# Patient Record
Sex: MALE | Race: OTHER | HISPANIC OR LATINO | URBAN - METROPOLITAN AREA
[De-identification: names, ages, dates, MRNs, and addresses within clinical notes are randomized per-mention and may not be internally consistent; named-entity substitution may affect disease eponyms.]

---

## 2018-07-16 ENCOUNTER — EMERGENCY (EMERGENCY)
Facility: HOSPITAL | Age: 63
LOS: 1 days | Discharge: HOME | End: 2018-07-16
Attending: EMERGENCY MEDICINE
Payer: SELF-PAY

## 2018-07-16 VITALS — WEIGHT: 186.95 LBS

## 2018-07-16 LAB
ALBUMIN SERPL ELPH-MCNC: 4.4 G/DL — SIGNIFICANT CHANGE UP (ref 3.3–5.2)
ALP SERPL-CCNC: 85 U/L — SIGNIFICANT CHANGE UP (ref 40–120)
ALT FLD-CCNC: 36 U/L — SIGNIFICANT CHANGE UP
ANION GAP SERPL CALC-SCNC: 14 MMOL/L — SIGNIFICANT CHANGE UP (ref 5–17)
APPEARANCE UR: ABNORMAL
APTT BLD: 25.7 SEC — LOW (ref 27.5–37.4)
AST SERPL-CCNC: 29 U/L — SIGNIFICANT CHANGE UP
BACTERIA # UR AUTO: ABNORMAL
BASOPHILS # BLD AUTO: 0 K/UL — SIGNIFICANT CHANGE UP (ref 0–0.2)
BASOPHILS NFR BLD AUTO: 0.1 % — SIGNIFICANT CHANGE UP (ref 0–2)
BILIRUB SERPL-MCNC: 0.3 MG/DL — LOW (ref 0.4–2)
BILIRUB UR-MCNC: NEGATIVE — SIGNIFICANT CHANGE UP
BLD GP AB SCN SERPL QL: SIGNIFICANT CHANGE UP
BUN SERPL-MCNC: 14 MG/DL — SIGNIFICANT CHANGE UP (ref 8–20)
CALCIUM SERPL-MCNC: 9.3 MG/DL — SIGNIFICANT CHANGE UP (ref 8.6–10.2)
CHLORIDE SERPL-SCNC: 105 MMOL/L — SIGNIFICANT CHANGE UP (ref 98–107)
CO2 SERPL-SCNC: 24 MMOL/L — SIGNIFICANT CHANGE UP (ref 22–29)
COLOR SPEC: YELLOW — SIGNIFICANT CHANGE UP
CREAT SERPL-MCNC: 0.85 MG/DL — SIGNIFICANT CHANGE UP (ref 0.5–1.3)
DIFF PNL FLD: ABNORMAL
EOSINOPHIL # BLD AUTO: 0 K/UL — SIGNIFICANT CHANGE UP (ref 0–0.5)
EOSINOPHIL NFR BLD AUTO: 0.2 % — SIGNIFICANT CHANGE UP (ref 0–5)
EPI CELLS # UR: SIGNIFICANT CHANGE UP
GLUCOSE SERPL-MCNC: 129 MG/DL — HIGH (ref 70–115)
GLUCOSE UR QL: NEGATIVE MG/DL — SIGNIFICANT CHANGE UP
HCT VFR BLD CALC: 45.2 % — SIGNIFICANT CHANGE UP (ref 42–52)
HGB BLD-MCNC: 15.3 G/DL — SIGNIFICANT CHANGE UP (ref 14–18)
INR BLD: 1.04 RATIO — SIGNIFICANT CHANGE UP (ref 0.88–1.16)
KETONES UR-MCNC: ABNORMAL
LEUKOCYTE ESTERASE UR-ACNC: ABNORMAL
LYMPHOCYTES # BLD AUTO: 1.2 K/UL — SIGNIFICANT CHANGE UP (ref 1–4.8)
LYMPHOCYTES # BLD AUTO: 8.6 % — LOW (ref 20–55)
MCHC RBC-ENTMCNC: 29.2 PG — SIGNIFICANT CHANGE UP (ref 27–31)
MCHC RBC-ENTMCNC: 33.8 G/DL — SIGNIFICANT CHANGE UP (ref 32–36)
MCV RBC AUTO: 86.3 FL — SIGNIFICANT CHANGE UP (ref 80–94)
MONOCYTES # BLD AUTO: 0.4 K/UL — SIGNIFICANT CHANGE UP (ref 0–0.8)
MONOCYTES NFR BLD AUTO: 2.5 % — LOW (ref 3–10)
NEUTROPHILS # BLD AUTO: 12.5 K/UL — HIGH (ref 1.8–8)
NEUTROPHILS NFR BLD AUTO: 88.3 % — HIGH (ref 37–73)
NITRITE UR-MCNC: NEGATIVE — SIGNIFICANT CHANGE UP
PH UR: 5 — SIGNIFICANT CHANGE UP (ref 5–8)
PLATELET # BLD AUTO: 176 K/UL — SIGNIFICANT CHANGE UP (ref 150–400)
POTASSIUM SERPL-MCNC: 4 MMOL/L — SIGNIFICANT CHANGE UP (ref 3.5–5.3)
POTASSIUM SERPL-SCNC: 4 MMOL/L — SIGNIFICANT CHANGE UP (ref 3.5–5.3)
PROT SERPL-MCNC: 7.6 G/DL — SIGNIFICANT CHANGE UP (ref 6.6–8.7)
PROT UR-MCNC: 30 MG/DL
PROTHROM AB SERPL-ACNC: 11.5 SEC — SIGNIFICANT CHANGE UP (ref 9.8–12.7)
RBC # BLD: 5.24 M/UL — SIGNIFICANT CHANGE UP (ref 4.6–6.2)
RBC # FLD: 12.6 % — SIGNIFICANT CHANGE UP (ref 11–15.6)
RBC CASTS # UR COMP ASSIST: >50 /HPF (ref 0–4)
SODIUM SERPL-SCNC: 143 MMOL/L — SIGNIFICANT CHANGE UP (ref 135–145)
SP GR SPEC: 1.01 — SIGNIFICANT CHANGE UP (ref 1.01–1.02)
TROPONIN T SERPL-MCNC: <0.01 NG/ML — SIGNIFICANT CHANGE UP (ref 0–0.06)
TYPE + AB SCN PNL BLD: SIGNIFICANT CHANGE UP
UROBILINOGEN FLD QL: NEGATIVE MG/DL — SIGNIFICANT CHANGE UP
WBC # BLD: 14.2 K/UL — HIGH (ref 4.8–10.8)
WBC # FLD AUTO: 14.2 K/UL — HIGH (ref 4.8–10.8)
WBC UR QL: SIGNIFICANT CHANGE UP

## 2018-07-16 PROCEDURE — 71045 X-RAY EXAM CHEST 1 VIEW: CPT | Mod: 26

## 2018-07-16 PROCEDURE — 85027 COMPLETE CBC AUTOMATED: CPT

## 2018-07-16 PROCEDURE — 99285 EMERGENCY DEPT VISIT HI MDM: CPT

## 2018-07-16 PROCEDURE — 83690 ASSAY OF LIPASE: CPT

## 2018-07-16 PROCEDURE — 84484 ASSAY OF TROPONIN QUANT: CPT

## 2018-07-16 PROCEDURE — 74176 CT ABD & PELVIS W/O CONTRAST: CPT

## 2018-07-16 PROCEDURE — 71045 X-RAY EXAM CHEST 1 VIEW: CPT

## 2018-07-16 PROCEDURE — 86850 RBC ANTIBODY SCREEN: CPT

## 2018-07-16 PROCEDURE — 96375 TX/PRO/DX INJ NEW DRUG ADDON: CPT

## 2018-07-16 PROCEDURE — 81001 URINALYSIS AUTO W/SCOPE: CPT

## 2018-07-16 PROCEDURE — 80053 COMPREHEN METABOLIC PANEL: CPT

## 2018-07-16 PROCEDURE — 93010 ELECTROCARDIOGRAM REPORT: CPT

## 2018-07-16 PROCEDURE — 86900 BLOOD TYPING SEROLOGIC ABO: CPT

## 2018-07-16 PROCEDURE — 93005 ELECTROCARDIOGRAM TRACING: CPT

## 2018-07-16 PROCEDURE — 85610 PROTHROMBIN TIME: CPT

## 2018-07-16 PROCEDURE — 36415 COLL VENOUS BLD VENIPUNCTURE: CPT

## 2018-07-16 PROCEDURE — 99284 EMERGENCY DEPT VISIT MOD MDM: CPT | Mod: 25

## 2018-07-16 PROCEDURE — 86901 BLOOD TYPING SEROLOGIC RH(D): CPT

## 2018-07-16 PROCEDURE — T1013: CPT

## 2018-07-16 PROCEDURE — 96374 THER/PROPH/DIAG INJ IV PUSH: CPT

## 2018-07-16 PROCEDURE — 85730 THROMBOPLASTIN TIME PARTIAL: CPT

## 2018-07-16 PROCEDURE — 74176 CT ABD & PELVIS W/O CONTRAST: CPT | Mod: 26

## 2018-07-16 RX ORDER — SODIUM CHLORIDE 9 MG/ML
3 INJECTION INTRAMUSCULAR; INTRAVENOUS; SUBCUTANEOUS ONCE
Qty: 0 | Refills: 0 | Status: COMPLETED | OUTPATIENT
Start: 2018-07-16 | End: 2018-07-16

## 2018-07-16 RX ORDER — AMLODIPINE BESYLATE 2.5 MG/1
5 TABLET ORAL ONCE
Qty: 0 | Refills: 0 | Status: COMPLETED | OUTPATIENT
Start: 2018-07-16 | End: 2018-07-16

## 2018-07-16 RX ORDER — ONDANSETRON 8 MG/1
4 TABLET, FILM COATED ORAL ONCE
Qty: 0 | Refills: 0 | Status: COMPLETED | OUTPATIENT
Start: 2018-07-16 | End: 2018-07-16

## 2018-07-16 RX ORDER — TAMSULOSIN HYDROCHLORIDE 0.4 MG/1
1 CAPSULE ORAL
Qty: 5 | Refills: 0 | OUTPATIENT
Start: 2018-07-16 | End: 2018-07-20

## 2018-07-16 RX ORDER — LABETALOL HCL 100 MG
10 TABLET ORAL ONCE
Qty: 0 | Refills: 0 | Status: COMPLETED | OUTPATIENT
Start: 2018-07-16 | End: 2018-07-16

## 2018-07-16 RX ORDER — SODIUM CHLORIDE 9 MG/ML
1000 INJECTION INTRAMUSCULAR; INTRAVENOUS; SUBCUTANEOUS ONCE
Qty: 0 | Refills: 0 | Status: COMPLETED | OUTPATIENT
Start: 2018-07-16 | End: 2018-07-16

## 2018-07-16 RX ORDER — IBUPROFEN 200 MG
1 TABLET ORAL
Qty: 15 | Refills: 0 | OUTPATIENT
Start: 2018-07-16 | End: 2018-07-20

## 2018-07-16 RX ORDER — MORPHINE SULFATE 50 MG/1
4 CAPSULE, EXTENDED RELEASE ORAL ONCE
Qty: 0 | Refills: 0 | Status: DISCONTINUED | OUTPATIENT
Start: 2018-07-16 | End: 2018-07-16

## 2018-07-16 RX ORDER — CEFTRIAXONE 500 MG/1
1 INJECTION, POWDER, FOR SOLUTION INTRAMUSCULAR; INTRAVENOUS ONCE
Qty: 0 | Refills: 0 | Status: COMPLETED | OUTPATIENT
Start: 2018-07-16 | End: 2018-07-16

## 2018-07-16 RX ORDER — CEPHALEXIN 500 MG
1 CAPSULE ORAL
Qty: 15 | Refills: 0 | OUTPATIENT
Start: 2018-07-16 | End: 2018-07-20

## 2018-07-16 RX ADMIN — MORPHINE SULFATE 4 MILLIGRAM(S): 50 CAPSULE, EXTENDED RELEASE ORAL at 22:21

## 2018-07-16 RX ADMIN — Medication 10 MILLIGRAM(S): at 23:22

## 2018-07-16 RX ADMIN — ONDANSETRON 4 MILLIGRAM(S): 8 TABLET, FILM COATED ORAL at 22:21

## 2018-07-16 RX ADMIN — CEFTRIAXONE 100 GRAM(S): 500 INJECTION, POWDER, FOR SOLUTION INTRAMUSCULAR; INTRAVENOUS at 23:01

## 2018-07-16 RX ADMIN — AMLODIPINE BESYLATE 5 MILLIGRAM(S): 2.5 TABLET ORAL at 23:22

## 2018-07-16 RX ADMIN — Medication 0.1 MILLIGRAM(S): at 22:29

## 2018-07-16 RX ADMIN — SODIUM CHLORIDE 3 MILLILITER(S): 9 INJECTION INTRAMUSCULAR; INTRAVENOUS; SUBCUTANEOUS at 22:05

## 2018-07-16 NOTE — ED PROVIDER NOTE - CARE PLAN
Principal Discharge DX:	Renal colic on right side  Secondary Diagnosis:	Elevated blood pressure reading

## 2018-07-16 NOTE — ED PROVIDER NOTE - PROGRESS NOTE DETAILS
The patient appears well and currently has no pain.  No HA, No dizziness and No CP, No SOB.  The patient has elevated BP, will DC home and will have close follow up in outpatient clinic Case s/o to Dr Asif to re-check the BP

## 2018-07-16 NOTE — ED PROVIDER NOTE - MUSCULOSKELETAL, MLM
Spine appears normal, range of motion is not limited, no muscle or joint tenderness. No flank tenderness.

## 2018-07-16 NOTE — ED ADULT NURSE NOTE - DISCHARGE TEACHING
meds at pharmacy for stone and BP. VERY IMPORTANT must f//u with [facility] for BP.  used to educate patient and pt verbalized understanding.

## 2018-07-16 NOTE — ED PROVIDER NOTE - MEDICAL DECISION MAKING DETAILS
Patient presents with symptoms consistent with renal stone. Will check labs, non-contrast CT stone hunt, medicate for high blood pressure and re-evaluate.

## 2018-07-16 NOTE — ED PROVIDER NOTE - OBJECTIVE STATEMENT
63 y/o M presents to ED c/o sudden onset of constant sharp lower abdominal pain onset 1700 today. made worse with urination. Patient states the pain does not radiate, and he has associated nausea, dysuria/ burning sensation with urination and hematuria. As per girlfriend at bedside, the patient has not been to a doctor in a very long time. Upon finding out his BP was high in the ED, he states "maybe that is why I have been very dizzy". Admits to recent sexual activity. Denies fevers, chills, chest pain, back pain, vomiting, diarrhea, penile discharge or headaches. No further acute complaints at this time.     PMD: Does not currently have one

## 2018-07-16 NOTE — ED ADULT NURSE NOTE - OBJECTIVE STATEMENT
Pt playful upon assessment states he had sudden onset of RLQ pain with 1 episode of vomiting which has now resolved. Pt non-tender to palpation to RLQ. Pt also states he has slight hematuria but no pain. Pt without CVA tenderness. Pt denies fever/chills.

## 2018-07-17 VITALS — SYSTOLIC BLOOD PRESSURE: 162 MMHG | DIASTOLIC BLOOD PRESSURE: 103 MMHG

## 2018-07-18 ENCOUNTER — EMERGENCY (EMERGENCY)
Facility: HOSPITAL | Age: 63
LOS: 1 days | Discharge: LEFT BEFORE DISCHARGE | End: 2018-07-18
Payer: SELF-PAY

## 2018-07-18 VITALS — HEIGHT: 63 IN | WEIGHT: 189.6 LBS

## 2018-07-18 PROCEDURE — T1013: CPT

## 2018-07-19 VITALS
RESPIRATION RATE: 18 BRPM | HEART RATE: 80 BPM | OXYGEN SATURATION: 95 % | SYSTOLIC BLOOD PRESSURE: 183 MMHG | DIASTOLIC BLOOD PRESSURE: 103 MMHG | TEMPERATURE: 98 F

## 2018-12-18 ENCOUNTER — INPATIENT (INPATIENT)
Facility: HOSPITAL | Age: 63
LOS: 1 days | Discharge: HOME | DRG: 42 | End: 2018-12-20
Attending: HOSPITALIST | Admitting: HOSPITALIST
Payer: COMMERCIAL

## 2018-12-18 VITALS
OXYGEN SATURATION: 99 % | RESPIRATION RATE: 18 BRPM | SYSTOLIC BLOOD PRESSURE: 197 MMHG | TEMPERATURE: 98 F | HEIGHT: 63 IN | HEART RATE: 70 BPM | WEIGHT: 179.9 LBS | DIASTOLIC BLOOD PRESSURE: 114 MMHG

## 2018-12-18 DIAGNOSIS — I16.0 HYPERTENSIVE URGENCY: ICD-10-CM

## 2018-12-18 DIAGNOSIS — I63.321 CEREBRAL INFARCTION DUE TO THROMBOSIS OF RIGHT ANTERIOR CEREBRAL ARTERY: ICD-10-CM

## 2018-12-18 DIAGNOSIS — Z72.0 TOBACCO USE: ICD-10-CM

## 2018-12-18 DIAGNOSIS — E03.9 HYPOTHYROIDISM, UNSPECIFIED: ICD-10-CM

## 2018-12-18 DIAGNOSIS — Z79.82 LONG TERM (CURRENT) USE OF ASPIRIN: ICD-10-CM

## 2018-12-18 DIAGNOSIS — Z88.1 ALLERGY STATUS TO OTHER ANTIBIOTIC AGENTS STATUS: ICD-10-CM

## 2018-12-18 DIAGNOSIS — R29.898 OTHER SYMPTOMS AND SIGNS INVOLVING THE MUSCULOSKELETAL SYSTEM: ICD-10-CM

## 2018-12-18 LAB
ALBUMIN SERPL ELPH-MCNC: 3.9 G/DL — SIGNIFICANT CHANGE UP (ref 3.3–5.2)
ALP SERPL-CCNC: 85 U/L — SIGNIFICANT CHANGE UP (ref 40–120)
ALT FLD-CCNC: 19 U/L — SIGNIFICANT CHANGE UP
ANION GAP SERPL CALC-SCNC: 10 MMOL/L — SIGNIFICANT CHANGE UP (ref 5–17)
APTT BLD: 35 SEC — SIGNIFICANT CHANGE UP (ref 27.5–36.3)
AST SERPL-CCNC: 20 U/L — SIGNIFICANT CHANGE UP
BASOPHILS # BLD AUTO: 0 K/UL — SIGNIFICANT CHANGE UP (ref 0–0.2)
BASOPHILS NFR BLD AUTO: 0.3 % — SIGNIFICANT CHANGE UP (ref 0–2)
BILIRUB SERPL-MCNC: 0.3 MG/DL — LOW (ref 0.4–2)
BLD GP AB SCN SERPL QL: SIGNIFICANT CHANGE UP
BUN SERPL-MCNC: 12 MG/DL — SIGNIFICANT CHANGE UP (ref 8–20)
CALCIUM SERPL-MCNC: 9.2 MG/DL — SIGNIFICANT CHANGE UP (ref 8.6–10.2)
CHLORIDE SERPL-SCNC: 104 MMOL/L — SIGNIFICANT CHANGE UP (ref 98–107)
CK SERPL-CCNC: 115 U/L — SIGNIFICANT CHANGE UP (ref 30–200)
CO2 SERPL-SCNC: 24 MMOL/L — SIGNIFICANT CHANGE UP (ref 22–29)
CREAT SERPL-MCNC: 0.57 MG/DL — SIGNIFICANT CHANGE UP (ref 0.5–1.3)
EOSINOPHIL # BLD AUTO: 0.2 K/UL — SIGNIFICANT CHANGE UP (ref 0–0.5)
EOSINOPHIL NFR BLD AUTO: 3.4 % — SIGNIFICANT CHANGE UP (ref 0–5)
GLUCOSE SERPL-MCNC: 97 MG/DL — SIGNIFICANT CHANGE UP (ref 70–115)
HCT VFR BLD CALC: 45.4 % — SIGNIFICANT CHANGE UP (ref 42–52)
HGB BLD-MCNC: 15.2 G/DL — SIGNIFICANT CHANGE UP (ref 14–18)
INR BLD: 1.08 RATIO — SIGNIFICANT CHANGE UP (ref 0.88–1.16)
LYMPHOCYTES # BLD AUTO: 1 K/UL — SIGNIFICANT CHANGE UP (ref 1–4.8)
LYMPHOCYTES # BLD AUTO: 16.8 % — LOW (ref 20–55)
MCHC RBC-ENTMCNC: 29 PG — SIGNIFICANT CHANGE UP (ref 27–31)
MCHC RBC-ENTMCNC: 33.5 G/DL — SIGNIFICANT CHANGE UP (ref 32–36)
MCV RBC AUTO: 86.5 FL — SIGNIFICANT CHANGE UP (ref 80–94)
MONOCYTES # BLD AUTO: 0.6 K/UL — SIGNIFICANT CHANGE UP (ref 0–0.8)
MONOCYTES NFR BLD AUTO: 10.5 % — HIGH (ref 3–10)
NEUTROPHILS # BLD AUTO: 4 K/UL — SIGNIFICANT CHANGE UP (ref 1.8–8)
NEUTROPHILS NFR BLD AUTO: 68.8 % — SIGNIFICANT CHANGE UP (ref 37–73)
PLATELET # BLD AUTO: 179 K/UL — SIGNIFICANT CHANGE UP (ref 150–400)
POTASSIUM SERPL-MCNC: 4.4 MMOL/L — SIGNIFICANT CHANGE UP (ref 3.5–5.3)
POTASSIUM SERPL-SCNC: 4.4 MMOL/L — SIGNIFICANT CHANGE UP (ref 3.5–5.3)
PROT SERPL-MCNC: 7 G/DL — SIGNIFICANT CHANGE UP (ref 6.6–8.7)
PROTHROM AB SERPL-ACNC: 12.5 SEC — SIGNIFICANT CHANGE UP (ref 10–12.9)
RBC # BLD: 5.25 M/UL — SIGNIFICANT CHANGE UP (ref 4.6–6.2)
RBC # FLD: 12.5 % — SIGNIFICANT CHANGE UP (ref 11–15.6)
SODIUM SERPL-SCNC: 138 MMOL/L — SIGNIFICANT CHANGE UP (ref 135–145)
TROPONIN T SERPL-MCNC: <0.01 NG/ML — SIGNIFICANT CHANGE UP (ref 0–0.06)
TYPE + AB SCN PNL BLD: SIGNIFICANT CHANGE UP
WBC # BLD: 5.8 K/UL — SIGNIFICANT CHANGE UP (ref 4.8–10.8)
WBC # FLD AUTO: 5.8 K/UL — SIGNIFICANT CHANGE UP (ref 4.8–10.8)

## 2018-12-18 PROCEDURE — 93306 TTE W/DOPPLER COMPLETE: CPT | Mod: 26

## 2018-12-18 PROCEDURE — 71045 X-RAY EXAM CHEST 1 VIEW: CPT | Mod: 26

## 2018-12-18 PROCEDURE — 70450 CT HEAD/BRAIN W/O DYE: CPT | Mod: 26

## 2018-12-18 PROCEDURE — 99223 1ST HOSP IP/OBS HIGH 75: CPT

## 2018-12-18 PROCEDURE — 99291 CRITICAL CARE FIRST HOUR: CPT

## 2018-12-18 PROCEDURE — 70551 MRI BRAIN STEM W/O DYE: CPT | Mod: 26

## 2018-12-18 PROCEDURE — 93010 ELECTROCARDIOGRAM REPORT: CPT

## 2018-12-18 PROCEDURE — 70547 MR ANGIOGRAPHY NECK W/O DYE: CPT | Mod: 26

## 2018-12-18 PROCEDURE — 70544 MR ANGIOGRAPHY HEAD W/O DYE: CPT | Mod: 26,59

## 2018-12-18 RX ORDER — ATORVASTATIN CALCIUM 80 MG/1
80 TABLET, FILM COATED ORAL AT BEDTIME
Qty: 0 | Refills: 0 | Status: DISCONTINUED | OUTPATIENT
Start: 2018-12-18 | End: 2018-12-20

## 2018-12-18 RX ORDER — ONDANSETRON 8 MG/1
4 TABLET, FILM COATED ORAL EVERY 6 HOURS
Qty: 0 | Refills: 0 | Status: DISCONTINUED | OUTPATIENT
Start: 2018-12-18 | End: 2018-12-20

## 2018-12-18 RX ORDER — HYDRALAZINE HCL 50 MG
5 TABLET ORAL EVERY 6 HOURS
Qty: 0 | Refills: 0 | Status: DISCONTINUED | OUTPATIENT
Start: 2018-12-18 | End: 2018-12-20

## 2018-12-18 RX ORDER — LABETALOL HCL 100 MG
10 TABLET ORAL ONCE
Qty: 0 | Refills: 0 | Status: COMPLETED | OUTPATIENT
Start: 2018-12-18 | End: 2018-12-18

## 2018-12-18 RX ORDER — ASPIRIN/CALCIUM CARB/MAGNESIUM 324 MG
325 TABLET ORAL DAILY
Qty: 0 | Refills: 0 | Status: DISCONTINUED | OUTPATIENT
Start: 2018-12-19 | End: 2018-12-20

## 2018-12-18 RX ORDER — ENOXAPARIN SODIUM 100 MG/ML
40 INJECTION SUBCUTANEOUS DAILY
Qty: 0 | Refills: 0 | Status: DISCONTINUED | OUTPATIENT
Start: 2018-12-18 | End: 2018-12-20

## 2018-12-18 RX ORDER — ASPIRIN/CALCIUM CARB/MAGNESIUM 324 MG
325 TABLET ORAL ONCE
Qty: 0 | Refills: 0 | Status: COMPLETED | OUTPATIENT
Start: 2018-12-18 | End: 2018-12-18

## 2018-12-18 RX ORDER — ACETAMINOPHEN 500 MG
650 TABLET ORAL EVERY 6 HOURS
Qty: 0 | Refills: 0 | Status: DISCONTINUED | OUTPATIENT
Start: 2018-12-18 | End: 2018-12-20

## 2018-12-18 RX ORDER — NICOTINE POLACRILEX 2 MG
1 GUM BUCCAL DAILY
Qty: 0 | Refills: 0 | Status: DISCONTINUED | OUTPATIENT
Start: 2018-12-18 | End: 2018-12-20

## 2018-12-18 RX ADMIN — ENOXAPARIN SODIUM 40 MILLIGRAM(S): 100 INJECTION SUBCUTANEOUS at 16:52

## 2018-12-18 RX ADMIN — Medication 10 MILLIGRAM(S): at 13:59

## 2018-12-18 RX ADMIN — Medication 325 MILLIGRAM(S): at 16:52

## 2018-12-18 NOTE — ED ADULT TRIAGE NOTE - CHIEF COMPLAINT QUOTE
pt arrive to ED with reports of on and off left sided weakness, last episode began 11:30am pt arrive to ED with reports of on and off left sided weakness onset 6am when woke up, last episode began 11:30am

## 2018-12-18 NOTE — ED PROVIDER NOTE - PRINCIPAL DIAGNOSIS
Cerebrovascular accident (CVA) due to thrombosis of right anterior cerebral artery TIA (transient ischemic attack)

## 2018-12-18 NOTE — H&P ADULT - HISTORY OF PRESENT ILLNESS
63 yom with pmh of active smoker, poor medical follow up presents with left sided weakness which started this am at 5am. Patient states he went to sleep in his usual state of health and woke up at 5am like he usually does and felt left arm and leg weakness which lasted over 30 minutes. Patient states he was unable to move any part of his body.  Patient seen in the er and was a code stroke with NIH score of 1. Patient was not given tpa. Patient seen at bedside and states all symptoms have resolved. Patient states feeling hungry

## 2018-12-18 NOTE — ED PROVIDER NOTE - OBJECTIVE STATEMENT
63M with hx of htn (non-compliant with medications) p/w intermittent left sided weakness since waking up this morning at about 630. patient states worse when walking. sx resolved and then recurred about 30mins PTA. no similar in the past. no neck pain, visual changes, ha. does not take blood thinners.

## 2018-12-18 NOTE — STROKE CODE NOTE - SUBJECTIVE
63 years old man was reported to be completely normal at around 11 AM, when he was driving. At around 11:30 AM, he reports to have developed left-sided V. was involving arm and the leg, leg worse than arm. He also reports to have noticed since anesthesia in the form of numbness on the left side. He denies any facial droop or dysarthria associated with his neurological symptoms. He reports to have similar episode upon waking up in the morning at around 5 AM lasting for about half an hour with complete resolution. He denies any focal neurological symptoms since then to around 11:30 AM. Since his arrival to the ED, he reports significant improvement in almost complete resolution of his focal neurological symptoms such deficits.

## 2018-12-18 NOTE — ED PROVIDER NOTE - PROGRESS NOTE DETAILS
pt had extensive eval by telestroke neuro dr hernandes - given improvement of symptoms and NIH essentially zero didn't feel candidate for TPA

## 2018-12-18 NOTE — ED ADULT NURSE REASSESSMENT NOTE - NS ED NURSE REASSESS COMMENT FT1
Pt returned from MRI and ECHO without incident.  Pt transported to CDU and report given to SHELBY Hale.  Pt remains awake, alert and oriented x3 with no signs of acute distress.  Girlfriend at bedside.

## 2018-12-18 NOTE — ED STATDOCS - PROGRESS NOTE DETAILS
62 y/o M pt presents to ED c/o left upper and left lower extremity weakness starting 30 minutes PTA. Pt states when he woke up at 5am today; he felt "loss of balance" to his left upper extremity and left lower extremity. Symptoms lasted approximately 30 minutes. Yesterday pt felt completely normal.   : Salma 62 y/o M pt presents to ED c/o left upper and left lower extremity weakness starting 30 minutes PTA. Pt states when he woke up at 5am today; he felt "loss of balance" and weakness to his left upper extremity and left lower extremity. Symptoms lasted approximately 30 minutes and resolved. Pt states that the Sx returned 30 minytes ago. He states that he feels as if his leg is going to giv eout on him and it feels weak  Gen: NAD, well appearing CV: RRRPul: CTA b/l Abd: Soft, non-distended, non-tender Neuro: MS 5/5 b/l UE, 5/5 b/l LE, mild sensory loss to LLE, neg Romberg, decreased ROM with ambulation to LLE  : Salma

## 2018-12-18 NOTE — ED PROVIDER NOTE - CARE PLAN
Principal Discharge DX:	Cerebrovascular accident (CVA) due to thrombosis of right anterior cerebral artery Principal Discharge DX:	TIA (transient ischemic attack)

## 2018-12-18 NOTE — ED ADULT NURSE NOTE - OBJECTIVE STATEMENT
received pt awake and alert with  Kody at bedside with MD miranda, pt  c/o left arm and left leg weakness started around 5am which had then resolved with multiple episodes of similar symptoms since this morning, last episode starting around 11am, pt ambulated in ED w/o difficulty, MAEx4 with strength and purpose, no focal deficits noted at this time. states his symptoms are getting better.

## 2018-12-18 NOTE — ED PROVIDER NOTE - ATTENDING CONTRIBUTION TO CARE
I personally saw the patient with the resident, and completed the key components of the history and physical exam. I then discussed the management plan with the resident.    presents with lle weakness with walking - have ing troubles walking earlier but resolved - had episode overnight at 5 am similar that resolved and then again approx 11 am then came here -- code stroke called from intake by dr witt - my exam NIH zero - telestroke procedures followed not candiate for TPA per Dr Arechiga from teleneuro will admit for possible TIA/lacunar infact furhter intpt w/u otherwise exam wnl   ***GEN - NAD; well appearing; A+O x3 ***HEAD - NC/AT ***EYES/NOSE - PEERL, EOMI, mucous membranes moist, no discharge ***THROAT: Oral cavity and pharynx normal. No inflammation***NECK: Neck supple  ***PULMONARY - CTA b/l, symmetric breath sounds. ***CARDIAC -s1s2, RRR  ***ABDOMEN - +BS, ND, NT, soft, no guarding, no rebound, no masses   ***BACK - no CVA tenderness, Normal  spine ***EXTREMITIES - symmetric pulses, 2+ dp, capillary refill < 2 seconds, no clubbing, no cyanosis, no edema ***SKIN - no rash or bruising   ***NEUROLOGIC - alert, CN 2-12 intact, reflexes nl, sensation nl, coordination negative, motor 5/5 RUE/LUE/RLE/LLE gait nl, ***PSYCH - insight and judgment nl, memory nl, affect nl, thought nl

## 2018-12-18 NOTE — ED PROVIDER NOTE - NEUROLOGICAL, MLM
Alert and oriented, no focal deficits, no motor deficits. normal gait. slight decreased sensation on left LE.

## 2018-12-18 NOTE — CONSULT NOTE ADULT - SUBJECTIVE AND OBJECTIVE BOX
[facility]                                   [address] [place] [address]                                                      PHONE: [phone]                                                         FAX: [phone]  [email or web address]  ---------------------------------------------------------------------------------------------------------------------------------    Reason for Consult: TIA/CVA     HPI:  GEOVANI CANALES is an 63y Male with history of HTN active smoker who presents to ED C/O left sided weakness that started this morning ~ 11 AM lasted about 30 minutes. The patient also C/O of numbness on the left sided without facial droop or dysarthria.  Patient not a TPA candidate as per neurology.  The patient denies any chest pain or sob.               PAST MEDICAL & SURGICAL HISTORY:  No pertinent past medical history  No significant past surgical history      ampicillin (Unknown)      MEDICATIONS  (STANDING):  aspirin 325 milliGRAM(s) Oral Once  atorvastatin 80 milliGRAM(s) Oral at bedtime  enoxaparin Injectable 40 milliGRAM(s) SubCutaneous daily  nicotine - 21 mG/24Hr(s) Patch 1 patch Transdermal daily    MEDICATIONS  (PRN):  acetaminophen   Tablet .. 650 milliGRAM(s) Oral every 6 hours PRN Temp greater or equal to 38C (100.4F), Mild Pain (1 - 3)  ondansetron Injectable 4 milliGRAM(s) IV Push every 6 hours PRN Nausea and/or Vomiting      Social History:  Cigarettes:       active smoker 1/2 PPD             Alchohol:  occ               Illicit Drug Abuse:  none    FH negative for CAD     ROS: Negative other than as mentioned in HPI.    Vital Signs Last 24 Hrs  T(C): 36.8 (18 Dec 2018 12:12), Max: 36.8 (18 Dec 2018 12:12)  T(F): 98.2 (18 Dec 2018 12:12), Max: 98.2 (18 Dec 2018 12:12)  HR: 69 (18 Dec 2018 12:55) (69 - 73)  BP: 182/115 (18 Dec 2018 12:55) (171/91 - 205/125)  BP(mean): --  RR: 18 (18 Dec 2018 12:55) (16 - 18)  SpO2: 100% (18 Dec 2018 12:55) (98% - 100%)  ICU Vital Signs Last 24 Hrs  GEOVANI AQUINOPLASENCIA  I&O's Detail    I&O's Summary    Drug Dosing Weight  GEOVANI AQUINOPLASENCIA      PHYSICAL EXAM:  General: Appears well developed, well nourished alert and cooperative.  HEENT: Head; normocephalic, atraumatic.  Eyes: Pupils reactive, cornea wnl.  Neck: Supple, no nodes adenopathy, no NVD or carotid bruit or thyromegaly.  CARDIOVASCULAR: Normal S1 and S2, No murmur, rub, gallop or lift.   LUNGS: No rales, rhonchi or wheeze. Normal breath sounds bilaterally.  ABDOMEN: Soft, nontender without mass or organomegaly. bowel sounds normoactive.  EXTREMITIES: No clubbing, cyanosis or edema. Distal pulses wnl.   SKIN: warm and dry with normal turgor.  NEURO: Alert/oriented x 3/normal motor exam. No pathologic reflexes.    PSYCH: normal affect.        LABS:                        15.2   5.8   )-----------( 179      ( 18 Dec 2018 12:55 )             45.4     12-18    138  |  104  |  12.0  ----------------------------<  97  4.4   |  24.0  |  0.57    Ca    9.2      18 Dec 2018 12:55    TPro  7.0  /  Alb  3.9  /  TBili  0.3<L>  /  DBili  x   /  AST  20  /  ALT  19  /  AlkPhos  85  12-18    GEOVANI AQUINOPLASENCIA  CARDIAC MARKERS ( 18 Dec 2018 12:55 )  x     / <0.01 ng/mL / 115 U/L / x     / x          PT/INR - ( 18 Dec 2018 12:55 )   PT: 12.5 sec;   INR: 1.08 ratio         PTT - ( 18 Dec 2018 12:55 )  PTT:35.0 sec      RADIOLOGY & ADDITIONAL STUDIES:    INTERPRETATION OF TELEMETRY (personally reviewed): NSR     ECG: pending     ECHO: pending     STRESS TEST: none     CARDIAC CATHETERIZATION: none    Assessment and Plan:  In summary, GEOVANI CANALES is an 63y Male with past medical history significant for HTN active smoker who presents to ED C/O left sided weakness that started this morning ~ 11 AM lasted about 30 minutes. The patient also C/O of numbness on the left sided without facial droop or dysarthria.  Patient not a TPA candidate as per neurology.  TELE NSR     Plan  1.  Admit to TELE and monitor   2.  Check EKG   3.  Awaiting TTE to evaluate LV EF  4.  Neurology work up such MRI/MRA   5.  ASA and statin therapy   6.  May need PHYLLIS +/- ILR pending above work up   7.  Smoking cessation D/W with pt at length
[facility]                                        Neurology at [place]                                  Shoaib Holden, & Yohan                                      [address]                                           [place], [place], [address]                                                [phone]        CC: TIA    HISTORY:  The patient is a 63y Male who noted that at 5 am when he woke up, he had left sided weakness. This was initially quite severe. It lasted about half an hour and began to subside.   He presented to the ER where it was noted to be almost resolved.   He reports feeling back to normal now. There was no associated headache or dizziness.    PAST MEDICAL & SURGICAL HISTORY:  No pertinent past medical history  No significant past surgical history    MEDICATION PRIOR TO ADMISSION:  None.    MEDICATIONS  (STANDING):  aspirin 325 milliGRAM(s) Oral Once  atorvastatin 80 milliGRAM(s) Oral at bedtime  enoxaparin Injectable 40 milliGRAM(s) SubCutaneous daily  nicotine - 21 mG/24Hr(s) Patch 1 patch Transdermal daily    MEDICATIONS  (PRN):  acetaminophen   Tablet .. 650 milliGRAM(s) Oral every 6 hours PRN Temp greater or equal to 38C (100.4F), Mild Pain (1 - 3)  ondansetron Injectable 4 milliGRAM(s) IV Push every 6 hours PRN Nausea and/or Vomiting    Allergies  ampicillin (Unknown)    SOCIAL HISTORY:  Smoker.     FAMILY HISTORY:  Family history of essential hypertension (Father)  Mother and father  (accident)  Brother Diabetes Mellitus and Hypertension.  3 children alive and well.    ROS:  Constitutional: The patient denies fevers or weight changes.  Neuro: As per HPI.  Eyes: Denies blurry vision.  Ears/nose/throat: Denies Tinnitus.   Cardiac: Denies chest pain. Denies palpitations.  Respiratory: Denies shortness of breath.  GI: Denies abdominal pain, nausea, or vomiting.  : Denies change in urinary pattern.  Integumentary: Denies rash.  Psych: Denies recent mood changes.  Heme: denies easy bleeding/bruising.    Exam:  Vital Signs Last 24 Hrs  T(C): 36.8 (18 Dec 2018 12:12), Max: 36.8 (18 Dec 2018 12:12)  T(F): 98.2 (18 Dec 2018 12:12), Max: 98.2 (18 Dec 2018 12:12)  HR: 69 (18 Dec 2018 12:55) (69 - 73)  BP: 182/115 (18 Dec 2018 12:55) (171/91 - 205/125)  RR: 18 (18 Dec 2018 12:55) (16 - 18)  SpO2: 100% (18 Dec 2018 12:55) (98% - 100%)  General: NAD.   Carotid bruits absent.     Mental status: The patient is awake, alert, and fully oriented. There is no aphasia.    Cranial nerves: There is no papilledema. Pupils react symmetrically to light. There is no visual field deficit to confrontation. Extraocular motion is full with no nystagmus. There is no ptosis. Facial sensation is intact. Facial musculature is symmetric. Palate elevates symmetrically. Tongue is midline.    Motor: There is normal bulk and tone.  Strength is 5/5 in the right arm and leg.   Strength is 5/5 in the left arm and leg.    Sensation: Intact to light touch and pin.    Reflexes: 2+ throughout and plantar responses are flexor.    Cerebellar: There is no dysmetria on finger to nose testing.    LABS:                         15.2   5.8   )-----------( 179      ( 18 Dec 2018 12:55 )             45.4       12-18    138  |  104  |  12.0  ----------------------------<  97  4.4   |  24.0  |  0.57    Ca    9.2      18 Dec 2018 12:55    TPro  7.0  /  Alb  3.9  /  TBili  0.3<L>  /  DBili  x   /  AST  20  /  ALT  19  /  AlkPhos  85  12-18      PT/INR - ( 18 Dec 2018 12:55 )   PT: 12.5 sec;   INR: 1.08 ratio    PTT - ( 18 Dec 2018 12:55 )  PTT:35.0 sec    RADIOLOGY   CT head images reviewed (and concur with report): There is no acute pathology.

## 2018-12-18 NOTE — ED ADULT NURSE NOTE - CHIEF COMPLAINT QUOTE
pt arrive to ED with reports of on and off left sided weakness onset 6am when woke up, last episode began 11:30am

## 2018-12-18 NOTE — H&P ADULT - NSHPLABSRESULTS_GEN_ALL_CORE
15.2   5.8    )----------(  179       ( 18 Dec 2018 12:55 )               45.4      138    |  104    |  12.0   ----------------------------<  97         ( 18 Dec 2018 12:55 )  4.4     |  24.0   |  0.57     Ca    9.2        ( 18 Dec 2018 12:55 )    TPro  7.0    /  Alb  3.9    /  TBili  0.3    /  DBili  x      /  AST  20     /  ALT  19     /  AlkPhos  85     ( 18 Dec 2018 12:55 )    LIVER FUNCTIONS - ( 18 Dec 2018 12:55 )  Alb: 3.9 g/dL / Pro: 7.0 g/dL / ALK PHOS: 85 U/L / ALT: 19 U/L / AST: 20 U/L / GGT: x           PT/INR -  12.5 sec / 1.08 ratio   ( 18 Dec 2018 12:55 )       PTT -  35.0 sec   ( 18 Dec 2018 12:55 )  CAPILLARY BLOOD GLUCOSE    CARDIAC MARKERS ( 18 Dec 2018 12:55 )  x     / <0.01 ng/mL / 115 U/L / x     / x          ct head - negative    mri head - ordered    mra head and neck - ordered    tte - ordered

## 2018-12-18 NOTE — STROKE CODE NOTE - ASSESSMENT/PLAN
63 years old man is evaluated at OSH for acute onset of left-sided weakness. He reports to be labs known well at around 11 AM, when he was driving, without any focal neurological symptoms. At around 11:30 AM, he reports to have noticed left-sided weakness and numbness involving arm and leg. He reports to have noted significant improvement in his neurological deficits so far. Examination through telestroke revealed left nasolabial flattening, but was otherwise nonfocal including normal gait examination (casual, toe, heel  and tandem walking). CT brain did not show any evidence of acute infarct or hemorrhage.    Impression:  Cerebral thrombosis with/without cerebral infarction. Probable right ROLO distribution stroke involving subcortical locations like corona radiata, presenting as motor-sensory lacunar stroke syndrome - likely etiology being small vessel disease but possibility of large vessel disease and embolism from a proximal (cardiac/paradoxical) source needs to be ruled out

## 2018-12-18 NOTE — H&P ADULT - NSHPPHYSICALEXAM_GEN_ALL_CORE
PHYSICAL EXAM:    GENERAL: NAD, well-groomed, well-developed  HEAD:  Atraumatic, Normocephalic  EYES: EOMI, PERRLA, conjunctiva and sclera clear  ENMT: No tonsillar erythema, exudates, or enlargement; Moist mucous membranes,  NECK: Supple, No JVD, Normal thyroid  NERVOUS SYSTEM:  Alert & Oriented X3, Good concentration; Motor Strength 5/5 B/L upper and lower extremities;   CHEST/LUNG: Clear to percussion bilaterally; No rales, rhonchi, wheezing, or rubs  HEART: Regular rate and rhythm; No murmurs, rubs, or gallops  ABDOMEN: Soft, Nontender, Nondistended; Bowel sounds present  EXTREMITIES:  2+ Peripheral Pulses, No clubbing, cyanosis, or edema  LYMPH: No lymphadenopathy noted  SKIN: No rashes or lesions

## 2018-12-18 NOTE — ED ADULT NURSE REASSESSMENT NOTE - NS ED NURSE REASSESS COMMENT FT1
Assumed pt care at 1415.  Report  received from NISHANT Renee.  Pt awake, alert and oriented x3 with no signs of acute distress at this time.  Pt offering no complaints at this time, states all weakness has resolved.  Pt states he is hungry.  OK to feed as per Dr. Dallas.  Family at bedside.  [facility] Assumed pt care at 1415.  Report  received from NISHANT Renee.  Pt awake, alert and oriented x3 with no signs of acute distress at this time.  Pt offering no complaints at this time, states all weakness has resolved.  Pt states he is hungry.  OK to feed as per Dr. Dallas.  Family at bedside.  See flowsheet for [facility].  Assessment done with  at bedside.

## 2018-12-18 NOTE — CONSULT NOTE ADULT - ASSESSMENT
The patient is a 63y Male with no known history now status post episode of TIA.   His blood pressure is noted to he high.     TIA.   Agree with antiplatelet (aspirin) and statin.   Agree with work up as ordered.   Check MRI/A  Check lipid panel.     Hypertension   Follow blood pressure.  Treatment as needed per medicine.     Case discussed with Dr Kohli.

## 2018-12-19 ENCOUNTER — TRANSCRIPTION ENCOUNTER (OUTPATIENT)
Age: 63
End: 2018-12-19

## 2018-12-19 PROBLEM — Z00.00 ENCOUNTER FOR PREVENTIVE HEALTH EXAMINATION: Status: ACTIVE | Noted: 2018-12-19

## 2018-12-19 LAB
ANION GAP SERPL CALC-SCNC: 11 MMOL/L — SIGNIFICANT CHANGE UP (ref 5–17)
BUN SERPL-MCNC: 18 MG/DL — SIGNIFICANT CHANGE UP (ref 8–20)
CALCIUM SERPL-MCNC: 8.5 MG/DL — LOW (ref 8.6–10.2)
CHLORIDE SERPL-SCNC: 106 MMOL/L — SIGNIFICANT CHANGE UP (ref 98–107)
CHOLEST SERPL-MCNC: 140 MG/DL — SIGNIFICANT CHANGE UP (ref 110–199)
CO2 SERPL-SCNC: 23 MMOL/L — SIGNIFICANT CHANGE UP (ref 22–29)
CREAT SERPL-MCNC: 0.59 MG/DL — SIGNIFICANT CHANGE UP (ref 0.5–1.3)
GLUCOSE SERPL-MCNC: 101 MG/DL — SIGNIFICANT CHANGE UP (ref 70–115)
HBA1C BLD-MCNC: 5.4 % — SIGNIFICANT CHANGE UP (ref 4–5.6)
HCT VFR BLD CALC: 42.9 % — SIGNIFICANT CHANGE UP (ref 42–52)
HDLC SERPL-MCNC: 35 MG/DL — LOW
HGB BLD-MCNC: 13.9 G/DL — LOW (ref 14–18)
LIPID PNL WITH DIRECT LDL SERPL: 87 MG/DL — SIGNIFICANT CHANGE UP
MAGNESIUM SERPL-MCNC: 2 MG/DL — SIGNIFICANT CHANGE UP (ref 1.6–2.6)
MCHC RBC-ENTMCNC: 27.9 PG — SIGNIFICANT CHANGE UP (ref 27–31)
MCHC RBC-ENTMCNC: 32.4 G/DL — SIGNIFICANT CHANGE UP (ref 32–36)
MCV RBC AUTO: 86.1 FL — SIGNIFICANT CHANGE UP (ref 80–94)
PLATELET # BLD AUTO: 158 K/UL — SIGNIFICANT CHANGE UP (ref 150–400)
POTASSIUM SERPL-MCNC: 3.8 MMOL/L — SIGNIFICANT CHANGE UP (ref 3.5–5.3)
POTASSIUM SERPL-SCNC: 3.8 MMOL/L — SIGNIFICANT CHANGE UP (ref 3.5–5.3)
RBC # BLD: 4.98 M/UL — SIGNIFICANT CHANGE UP (ref 4.6–6.2)
RBC # FLD: 12.5 % — SIGNIFICANT CHANGE UP (ref 11–15.6)
SODIUM SERPL-SCNC: 140 MMOL/L — SIGNIFICANT CHANGE UP (ref 135–145)
TOTAL CHOLESTEROL/HDL RATIO MEASUREMENT: 4 RATIO — SIGNIFICANT CHANGE UP (ref 3.4–9.6)
TRIGL SERPL-MCNC: 92 MG/DL — SIGNIFICANT CHANGE UP (ref 10–200)
TSH SERPL-MCNC: 7.05 UIU/ML — HIGH (ref 0.27–4.2)
WBC # BLD: 5.1 K/UL — SIGNIFICANT CHANGE UP (ref 4.8–10.8)
WBC # FLD AUTO: 5.1 K/UL — SIGNIFICANT CHANGE UP (ref 4.8–10.8)

## 2018-12-19 PROCEDURE — 99233 SBSQ HOSP IP/OBS HIGH 50: CPT

## 2018-12-19 PROCEDURE — 93010 ELECTROCARDIOGRAM REPORT: CPT

## 2018-12-19 PROCEDURE — 99232 SBSQ HOSP IP/OBS MODERATE 35: CPT

## 2018-12-19 RX ORDER — LEVOTHYROXINE SODIUM 125 MCG
1 TABLET ORAL
Qty: 30 | Refills: 0 | OUTPATIENT
Start: 2018-12-19 | End: 2019-01-17

## 2018-12-19 RX ORDER — ATORVASTATIN CALCIUM 80 MG/1
1 TABLET, FILM COATED ORAL
Qty: 30 | Refills: 0 | OUTPATIENT
Start: 2018-12-19 | End: 2019-01-17

## 2018-12-19 RX ORDER — NICOTINE POLACRILEX 2 MG
1 GUM BUCCAL
Qty: 30 | Refills: 0 | OUTPATIENT
Start: 2018-12-19 | End: 2019-01-17

## 2018-12-19 RX ORDER — ASPIRIN/CALCIUM CARB/MAGNESIUM 324 MG
1 TABLET ORAL
Qty: 30 | Refills: 0 | OUTPATIENT
Start: 2018-12-19 | End: 2019-01-17

## 2018-12-19 RX ORDER — LEVOTHYROXINE SODIUM 125 MCG
25 TABLET ORAL DAILY
Qty: 0 | Refills: 0 | Status: DISCONTINUED | OUTPATIENT
Start: 2018-12-19 | End: 2018-12-20

## 2018-12-19 RX ORDER — INFLUENZA VIRUS VACCINE 15; 15; 15; 15 UG/.5ML; UG/.5ML; UG/.5ML; UG/.5ML
0.5 SUSPENSION INTRAMUSCULAR ONCE
Qty: 0 | Refills: 0 | Status: DISCONTINUED | OUTPATIENT
Start: 2018-12-19 | End: 2018-12-20

## 2018-12-19 RX ADMIN — ATORVASTATIN CALCIUM 80 MILLIGRAM(S): 80 TABLET, FILM COATED ORAL at 21:13

## 2018-12-19 RX ADMIN — Medication 325 MILLIGRAM(S): at 12:14

## 2018-12-19 RX ADMIN — Medication 25 MICROGRAM(S): at 12:14

## 2018-12-19 RX ADMIN — ATORVASTATIN CALCIUM 80 MILLIGRAM(S): 80 TABLET, FILM COATED ORAL at 05:18

## 2018-12-19 RX ADMIN — ENOXAPARIN SODIUM 40 MILLIGRAM(S): 100 INJECTION SUBCUTANEOUS at 12:14

## 2018-12-19 NOTE — PROGRESS NOTE ADULT - ASSESSMENT
1) TIA --> mri negative  --> npo for cristal/ilr tomorrow  --> asa and statin     2) smoker --> patch  --> advsied to quit    3) htn urgency --> resolved    4) new diagnossi of hypothyroid --> synthroid started  --> tsh in 6 weeks     dispo --> dc tomorrow

## 2018-12-19 NOTE — DISCHARGE NOTE ADULT - HOSPITAL COURSE
63 yom with pmh of active smoker, poor medical follow up presents with left sided weakness which started this am at 5am on day of admission. Patient states he went to sleep in his usual state of health and woke up at 5am like he usually does and felt left arm and leg weakness which lasted over 30 minutes. Patient states he was unable to move any part of his body.  Patient seen in the er and was a code stroke with NIH score of 1. Patient was not given tpa.   patient admitted to medicine and seen by cardio and neuro. mri head was negative    patient had new diagnossi of hypothyroid and started on synthroid. patient told to stop smoking and is now stable for dc home    time spent on dc 32 minutes 63 yom with pmh of active smoker, poor medical follow up presents with left sided weakness which started this am at 5am on day of admission. Patient states he went to sleep in his usual state of health and woke up at 5am like he usually does and felt left arm and leg weakness which lasted over 30 minutes. Patient states he was unable to move any part of his body.  Patient seen in the er and was a code stroke with NIH score of 1. Patient was not given tpa.   patient admitted to medicine and seen by cardio and neuro. mri head was negative    patient to get cristal and ilr today.     patient had new diagnossi of hypothyroid and started on synthroid. patient told to stop smoking and is now stable for dc home    time spent on dc 32 minutes

## 2018-12-19 NOTE — PHYSICAL THERAPY INITIAL EVALUATION ADULT - ADDITIONAL COMMENTS
pt states that he lives alone in a house where he rents a room. there ate 7 stairs to enter. no stairs inside.

## 2018-12-19 NOTE — DISCHARGE NOTE ADULT - CARE PROVIDER_API CALL
Darnell Shearer), Family Medicine  [address] [place] [id number]  Phone: [phone]  Fax: [phone]    Palmer Bedoya), Cardiology; Internal Medicine  [address] [place] [address]  Phone: [phone]  Fax: [phone]

## 2018-12-19 NOTE — PROGRESS NOTE ADULT - SUBJECTIVE AND OBJECTIVE BOX
Fax received from [employer]   Notice of approval for [employer] for 12 months through 4/6/18  PAcase number [id number].  Contact number if any questions [phone]    Call to pt to inform that medication was approved verified pharmacy with pt.  Rx escribed   GEOVANI PEARLNCIA    [id number]    63y      Male    INTERVAL HPI/OVERNIGHT EVENTS:    patient being seen for tia. Patient seen at bedside and denies any complaints      REVIEW OF SYSTEMS:    CONSTITUTIONAL: No fever, weight loss, or fatigue  RESPIRATORY: No cough, wheezing, hemoptysis; No shortness of breath  CARDIOVASCULAR: No chest pain, palpitations  GASTROINTESTINAL: No abdominal or epigastric pain. No nausea, vomiting  NEUROLOGICAL: No headaches, memory loss, loss of strength.  MISCELLANEOUS:      Vital Signs Last 24 Hrs  T(C): 36.6 (19 Dec 2018 08:12), Max: 36.9 (18 Dec 2018 16:59)  T(F): 97.9 (19 Dec 2018 08:12), Max: 98.4 (18 Dec 2018 16:59)  HR: 75 (19 Dec 2018 08:12) (62 - 75)  BP: 155/83 (19 Dec 2018 08:12) (120/75 - 205/125)  BP(mean): --  RR: 19 (19 Dec 2018 08:12) (16 - 19)  SpO2: 98% (19 Dec 2018 08:12) (96% - 100%)    PHYSICAL EXAM:    GENERAL: NAD, well-groomed  HEENT: PERRL, +EOMI  NECK: soft, Supple, No JVD,   CHEST/LUNG: Clear to auscultation bilaterally; No wheezing  HEART: S1S2+, Regular rate and rhythm; No murmurs, rubs, or gallops  ABDOMEN: Soft, Nontender, Nondistended; Bowel sounds present  EXTREMITIES:  2+ Peripheral Pulses, No clubbing, cyanosis, or edema  SKIN: No rashes or lesions  NEURO: AAOX3, no focal deficits, no motor r sensory loss  PSYCH: normal mood      LABS:                        13.9   5.1   )-----------( 158      ( 19 Dec 2018 06:50 )             42.9     12-19    140  |  106  |  18.0  ----------------------------<  101  3.8   |  23.0  |  0.59    Ca    8.5<L>      19 Dec 2018 06:50  Mg     2.0     12-19    TPro  7.0  /  Alb  3.9  /  TBili  0.3<L>  /  DBili  x   /  AST  20  /  ALT  19  /  AlkPhos  85  12-18    PT/INR - ( 18 Dec 2018 12:55 )   PT: 12.5 sec;   INR: 1.08 ratio         PTT - ( 18 Dec 2018 12:55 )  PTT:35.0 sec        MEDICATIONS  (STANDING):  aspirin 325 milliGRAM(s) Oral daily  atorvastatin 80 milliGRAM(s) Oral at bedtime  enoxaparin Injectable 40 milliGRAM(s) SubCutaneous daily  influenza   Vaccine 0.5 milliLiter(s) IntraMuscular once  levothyroxine 25 MICROGram(s) Oral daily  nicotine - 21 mG/24Hr(s) Patch 1 patch Transdermal daily    MEDICATIONS  (PRN):  acetaminophen   Tablet .. 650 milliGRAM(s) Oral every 6 hours PRN Temp greater or equal to 38C (100.4F), Mild Pain (1 - 3)  hydrALAZINE Injectable 5 milliGRAM(s) IV Push every 6 hours PRN for sbp above 200 mm hg  ondansetron Injectable 4 milliGRAM(s) IV Push every 6 hours PRN Nausea and/or Vomiting      RADIOLOGY & ADDITIONAL TESTS:    mri mra - negative

## 2018-12-19 NOTE — DISCHARGE NOTE ADULT - MEDICATION SUMMARY - MEDICATIONS TO TAKE
I will START or STAY ON the medications listed below when I get home from the hospital:    aspirin 325 mg oral tablet  -- 1 tab(s) by mouth once a day  -- Indication: For TIA (transient ischemic attack)    atorvastatin 80 mg oral tablet  -- 1 tab(s) by mouth once a day (at bedtime)  -- Indication: For TIA (transient ischemic attack)    nicotine 21 mg/24 hr transdermal film, extended release  -- 1 patch by transdermal patch once a day  -- Indication: For smoker    levothyroxine 25 mcg (0.025 mg) oral tablet  -- 1 tab(s) by mouth once a day  -- Indication: For hypothyroid I will START or STAY ON the medications listed below when I get home from the hospital:    work note  -- 1   -- Indication: For work    aspirin 325 mg oral tablet  -- 1 tab(s) by mouth once a day  -- Indication: For TIA (transient ischemic attack)    atorvastatin 80 mg oral tablet  -- 1 tab(s) by mouth once a day (at bedtime)  -- Indication: For TIA (transient ischemic attack)    nicotine 21 mg/24 hr transdermal film, extended release  -- 1 patch by transdermal patch once a day  -- Indication: For smoker    levothyroxine 25 mcg (0.025 mg) oral tablet  -- 1 tab(s) by mouth once a day  -- Indication: For hypothyroid I will START or STAY ON the medications listed below when I get home from the hospital:    work note  -- 1   -- Indication: For work    aspirin 325 mg oral tablet  -- 1 tab(s) by mouth once a day  -- Indication: For TIA (transient ischemic attack)    atorvastatin 80 mg oral tablet  -- 1 tab(s) by mouth once a day (at bedtime)  -- Indication: For TIA (transient ischemic attack)    Norvasc 10 mg oral tablet  -- 1 tab(s) by mouth once a day   -- It is very important that you take or use this exactly as directed.  Do not skip doses or discontinue unless directed by your doctor.  Some non-prescription drugs may aggravate your condition.  Read all labels carefully.  If a warning appears, check with your doctor before taking.    -- Indication: For Htn    nicotine 21 mg/24 hr transdermal film, extended release  -- 1 patch by transdermal patch once a day  -- Indication: For smoker    levothyroxine 25 mcg (0.025 mg) oral tablet  -- 1 tab(s) by mouth once a day  -- Indication: For hypothyroid

## 2018-12-19 NOTE — PROGRESS NOTE ADULT - SUBJECTIVE AND OBJECTIVE BOX
[facility]                                   [address] [place] [address]                                                      PHONE: [phone]                                                         FAX: [phone]  [email or web address]  ---------------------------------------------------------------------------------------------------------------------------------    Overnight events/patient complaints:  PT feels well no complaints    ampicillin (Unknown)    MEDICATIONS  (STANDING):  aspirin 325 milliGRAM(s) Oral daily  atorvastatin 80 milliGRAM(s) Oral at bedtime  enoxaparin Injectable 40 milliGRAM(s) SubCutaneous daily  influenza   Vaccine 0.5 milliLiter(s) IntraMuscular once  levothyroxine 25 MICROGram(s) Oral daily  nicotine - 21 mG/24Hr(s) Patch 1 patch Transdermal daily    MEDICATIONS  (PRN):  acetaminophen   Tablet .. 650 milliGRAM(s) Oral every 6 hours PRN Temp greater or equal to 38C (100.4F), Mild Pain (1 - 3)  hydrALAZINE Injectable 5 milliGRAM(s) IV Push every 6 hours PRN for sbp above 200 mm hg  ondansetron Injectable 4 milliGRAM(s) IV Push every 6 hours PRN Nausea and/or Vomiting      Vital Signs Last 24 Hrs  T(C): 36.6 (19 Dec 2018 04:24), Max: 36.9 (18 Dec 2018 16:59)  T(F): 97.9 (19 Dec 2018 04:24), Max: 98.4 (18 Dec 2018 16:59)  HR: 62 (19 Dec 2018 04:24) (62 - 73)  BP: 120/75 (19 Dec 2018 04:24) (120/75 - 205/125)  BP(mean): --  RR: 18 (19 Dec 2018 04:24) (16 - 18)  SpO2: 98% (19 Dec 2018 04:24) (96% - 100%)  ICU Vital Signs Last 24 Hrs  GEOVANI CANALES  I&O's Detail    Drug Dosing Weight  GEOVANI CANALES      PHYSICAL EXAM:  General: Appears well developed, well nourished alert and cooperative.  HEENT: Head; normocephalic, atraumatic.  Eyes: Pupils reactive, cornea wnl.  Neck: Supple, no nodes adenopathy, no NVD or carotid bruit or thyromegaly.  CARDIOVASCULAR: Normal S1 and S2, No murmur, rub, gallop or lift.   LUNGS: No rales, rhonchi or wheeze. Normal breath sounds bilaterally.  ABDOMEN: Soft, nontender without mass or organomegaly. bowel sounds normoactive.  EXTREMITIES: No clubbing, cyanosis or edema. Distal pulses wnl.   SKIN: warm and dry with normal turgor.  NEURO: Alert/oriented x 3/normal motor exam. No pathologic reflexes.    PSYCH: normal affect.        LABS:                        13.9   5.1   )-----------( 158      ( 19 Dec 2018 06:50 )             42.9     12-19    140  |  106  |  18.0  ----------------------------<  101  3.8   |  23.0  |  0.59    Ca    8.5<L>      19 Dec 2018 06:50  Mg     2.0     12-19    TPro  7.0  /  Alb  3.9  /  TBili  0.3<L>  /  DBili  x   /  AST  20  /  ALT  19  /  AlkPhos  85  12-18    GEOVANI CANALES  CARDIAC MARKERS ( 18 Dec 2018 12:55 )  x     / <0.01 ng/mL / 115 U/L / x     / x          PT/INR - ( 18 Dec 2018 12:55 )   PT: 12.5 sec;   INR: 1.08 ratio         PTT - ( 18 Dec 2018 12:55 )  PTT:35.0 sec      RADIOLOGY & ADDITIONAL STUDIES:    INTERPRETATION OF TELEMETRY (personally reviewed): no events        echo pending    ASSESSMENT AND PLAN:  In summary, GEOVANI CANALES is an 63y Male with past medical history significant for HTN, active smoker who presents to ED C/O left sided weakness likely TIA.    1) Echo pending        -IF no signficant abnormality will proceed with PHYLLIS/ILR tomorrow        -NPO after midnight    2) ASA, lipitor

## 2018-12-19 NOTE — PROGRESS NOTE ADULT - ASSESSMENT
The patient is a 63y Male with no known history now status post episode of TIA.       TIA.   Agree with antiplatelet (aspirin) and statin.   Agree with work up as ordered.   MRI/A as above  for PHYLLIS/ILR    Lipids  fasting lipid panel LDL 87  goal LDL is under 70  high dose statin - continue lipitor 80    Hypertension   Follow blood pressure.  Treatment as needed per medicine.     Case discussed with Dr Kohli.    will follow with you    Rojas Cramer MD PhD   [id number]

## 2018-12-19 NOTE — DISCHARGE NOTE ADULT - PLAN OF CARE
asa and statin advised to quit smoking resolved follow up with pcp patch advised to quit new diagnosis synthroid added  follow up with primary care

## 2018-12-19 NOTE — DISCHARGE NOTE ADULT - CARE PLAN
Principal Discharge DX:	TIA (transient ischemic attack)  Goal:	asa and statin  Assessment and plan of treatment:	advised to quit smoking  Secondary Diagnosis:	Hypertensive urgency  Goal:	resolved  Assessment and plan of treatment:	follow up with pcp  Secondary Diagnosis:	Smoker  Goal:	patch  Assessment and plan of treatment:	advised to quit  Secondary Diagnosis:	Hypothyroid  Goal:	new diagnosis  Assessment and plan of treatment:	synthroid added  follow up with primary care

## 2018-12-19 NOTE — DISCHARGE NOTE ADULT - MEDICATION SUMMARY - MEDICATIONS TO STOP TAKING
I will STOP taking the medications listed below when I get home from the hospital:    Flomax 0.4 mg oral capsule  -- 1 cap(s) by mouth once a day (at bedtime)   -- It is very important that you take or use this exactly as directed.  Do not skip doses or discontinue unless directed by your doctor.  May cause drowsiness.  Alcohol may intensify this effect.  Use care when operating dangerous machinery.  Some non-prescription drugs may aggravate your condition.  Read all labels carefully.  If a warning appears, check with your doctor before taking.  Swallow whole.  Do not crush.  Take with food or milk.    ibuprofen 600 mg oral tablet  -- 1 tab(s) by mouth every 8 hours   -- Do not take this drug if you are pregnant.  It is very important that you take or use this exactly as directed.  Do not skip doses or discontinue unless directed by your doctor.  May cause drowsiness or dizziness.  Obtain medical advice before taking any non-prescription drugs as some may affect the action of this medication.  Take with food or milk.    Keflex 500 mg oral capsule  -- 1 cap(s) by mouth 3 times a day   -- Finish all this medication unless otherwise directed by prescriber.    hydroCHLOROthiazide 12.5 mg oral tablet  -- 1 tab(s) by mouth once a day

## 2018-12-19 NOTE — DISCHARGE NOTE ADULT - PATIENT PORTAL LINK FT
You can access the [facility] Patient Portal, offered by [facility], by registering with the following website: [email or web address]

## 2018-12-19 NOTE — PROGRESS NOTE ADULT - SUBJECTIVE AND OBJECTIVE BOX
[facility]                                        Neu[facility] at [place]                                  Shoaib Holden, & Yohan                                      [address]                                           [place], [place], [address]                                                [phone]        CC: TIA  HPI: The patient is a 63y Male who noted that at 5 am when he woke up, he had left sided weakness. This was initially quite severe. It lasted about half an hour and began to subside. He presented to the ER where it was noted to be almost resolved. He reports feeling back to normal now. There was no associated headache or dizziness. (JW 12/18)    Interval history:  Feels well.  No further symptoms    ROS neurology: Denies headache or dizziness. Denies weakness/numbness.  Denies speech/language deficits. Denies diplopia/blurred vision.  Denies confusion    MEDICATIONS  (STANDING):  aspirin 325 milliGRAM(s) Oral daily  atorvastatin 80 milliGRAM(s) Oral at bedtime  enoxaparin Injectable 40 milliGRAM(s) SubCutaneous daily  influenza   Vaccine 0.5 milliLiter(s) IntraMuscular once  levothyroxine 25 MICROGram(s) Oral daily  nicotine - 21 mG/24Hr(s) Patch 1 patch Transdermal daily    MEDICATIONS  (PRN):  acetaminophen   Tablet .. 650 milliGRAM(s) Oral every 6 hours PRN Temp greater or equal to 38C (100.4F), Mild Pain (1 - 3)  hydrALAZINE Injectable 5 milliGRAM(s) IV Push every 6 hours PRN for sbp above 200 mm hg  ondansetron Injectable 4 milliGRAM(s) IV Push every 6 hours PRN Nausea and/or Vomiting      Vital Signs Last 24 Hrs  T(C): 36.6 (19 Dec 2018 15:19), Max: 36.8 (18 Dec 2018 19:13)  T(F): 97.8 (19 Dec 2018 15:19), Max: 98.3 (18 Dec 2018 19:13)  HR: 56 (19 Dec 2018 15:19) (56 - 75)  BP: 157/96 (19 Dec 2018 15:19) (120/75 - 157/96)  BP(mean): --  RR: 18 (19 Dec 2018 15:19) (18 - 19)  SpO2: 99% (19 Dec 2018 15:19) (96% - 99%)    Detailed neuro exam:    Mental status: The patient is awake, alert, and fully oriented. There is no aphasia.    Cranial nerves:  Pupils react symmetrically to light. There is no visual field deficit to confrontation. Extraocular motion is full with no nystagmus. There is no ptosis. Facial sensation is intact. Facial musculature is symmetric. Palate elevates symmetrically. Tongue is midline.    Motor: There is normal bulk and tone.  Strength is 5/5 in the right arm and leg.   Strength is 5/5 in the left arm and leg.    Sensation: Intact to light touch and pin.    Reflexes: 2+ throughout and plantar responses are flexor.    Cerebellar: There is no dysmetria on finger to nose testing.    LABS:                                    13.9   5.1   )-----------( 158      ( 19 Dec 2018 06:50 )             42.9     12-19    140  |  106  |  18.0  ----------------------------<  101  3.8   |  23.0  |  0.59    Ca    8.5<L>      19 Dec 2018 06:50  Mg     2.0     12-19    TPro  7.0  /  Alb  3.9  /  TBili  0.3<L>  /  DBili  x   /  AST  20  /  ALT  19  /  AlkPhos  85  12-18    LIVER FUNCTIONS - ( 18 Dec 2018 12:55 )  Alb: 3.9 g/dL / Pro: 7.0 g/dL / ALK PHOS: 85 U/L / ALT: 19 U/L / AST: 20 U/L / GGT: x           PT/INR - ( 18 Dec 2018 12:55 )   PT: 12.5 sec;   INR: 1.08 ratio         PTT - ( 18 Dec 2018 12:55 )  PTT:35.0 sec    Lipid Profile in AM (12.19.18 @ 06:50)    Total Cholesterol/HDL Ratio Measurement: 4.0 Ratio    Cholesterol, Serum: 140 mg/dL    Triglycerides, Serum: 92 mg/dL    HDL Cholesterol, Serum: 35: HDL Levels >/= 60 mg/dL are considered beneficial and a "negative" risk  factor.  Effective 08/15/2018: New reference range and interpretive comment. mg/dL    Direct LDL: 87: LDL Cholesterol (mg/dL) --- Interpretive Comment (for adults 18 and over)  Optimal LDL Level may vary based on clinical situation  Below 70                   Ideal for people at very high risk of heart  disease  Below 100                  Ideal for people at risk of heart disease  100 - 129                    [place]  130 - 159                    Borderline high  160 - 189                    High  190 and Above           Very high mg/dL      RADIOLOGY   Recent neurological studies (images reviewed independently):  MRI brain- no acute CVA, mass or blood  MRA head no aneurysm or AVM, hypoplastic posterior circulation  MRA neck hypoplastic left vert, no significant b/l ICA stenosis      Previous neurological studies:  CT head: There is no acute pathology.

## 2018-12-20 VITALS — DIASTOLIC BLOOD PRESSURE: 60 MMHG | SYSTOLIC BLOOD PRESSURE: 150 MMHG

## 2018-12-20 PROCEDURE — 70551 MRI BRAIN STEM W/O DYE: CPT

## 2018-12-20 PROCEDURE — 33282: CPT

## 2018-12-20 PROCEDURE — 85610 PROTHROMBIN TIME: CPT

## 2018-12-20 PROCEDURE — T1013: CPT

## 2018-12-20 PROCEDURE — 70547 MR ANGIOGRAPHY NECK W/O DYE: CPT

## 2018-12-20 PROCEDURE — 86850 RBC ANTIBODY SCREEN: CPT

## 2018-12-20 PROCEDURE — 99239 HOSP IP/OBS DSCHRG MGMT >30: CPT

## 2018-12-20 PROCEDURE — 93306 TTE W/DOPPLER COMPLETE: CPT

## 2018-12-20 PROCEDURE — 80048 BASIC METABOLIC PNL TOTAL CA: CPT

## 2018-12-20 PROCEDURE — 70450 CT HEAD/BRAIN W/O DYE: CPT

## 2018-12-20 PROCEDURE — 36415 COLL VENOUS BLD VENIPUNCTURE: CPT

## 2018-12-20 PROCEDURE — 83036 HEMOGLOBIN GLYCOSYLATED A1C: CPT

## 2018-12-20 PROCEDURE — 84484 ASSAY OF TROPONIN QUANT: CPT

## 2018-12-20 PROCEDURE — 93005 ELECTROCARDIOGRAM TRACING: CPT

## 2018-12-20 PROCEDURE — 71045 X-RAY EXAM CHEST 1 VIEW: CPT

## 2018-12-20 PROCEDURE — 93325 DOPPLER ECHO COLOR FLOW MAPG: CPT

## 2018-12-20 PROCEDURE — C1764: CPT

## 2018-12-20 PROCEDURE — 80053 COMPREHEN METABOLIC PANEL: CPT

## 2018-12-20 PROCEDURE — 84443 ASSAY THYROID STIM HORMONE: CPT

## 2018-12-20 PROCEDURE — 93312 ECHO TRANSESOPHAGEAL: CPT

## 2018-12-20 PROCEDURE — 97163 PT EVAL HIGH COMPLEX 45 MIN: CPT

## 2018-12-20 PROCEDURE — 82550 ASSAY OF CK (CPK): CPT

## 2018-12-20 PROCEDURE — 96372 THER/PROPH/DIAG INJ SC/IM: CPT | Mod: XU

## 2018-12-20 PROCEDURE — 83735 ASSAY OF MAGNESIUM: CPT

## 2018-12-20 PROCEDURE — 85027 COMPLETE CBC AUTOMATED: CPT

## 2018-12-20 PROCEDURE — 70544 MR ANGIOGRAPHY HEAD W/O DYE: CPT

## 2018-12-20 PROCEDURE — 80061 LIPID PANEL: CPT

## 2018-12-20 PROCEDURE — 86901 BLOOD TYPING SEROLOGIC RH(D): CPT

## 2018-12-20 PROCEDURE — 99291 CRITICAL CARE FIRST HOUR: CPT | Mod: 25

## 2018-12-20 PROCEDURE — 96374 THER/PROPH/DIAG INJ IV PUSH: CPT

## 2018-12-20 PROCEDURE — 86900 BLOOD TYPING SEROLOGIC ABO: CPT

## 2018-12-20 PROCEDURE — 82962 GLUCOSE BLOOD TEST: CPT

## 2018-12-20 PROCEDURE — 85730 THROMBOPLASTIN TIME PARTIAL: CPT

## 2018-12-20 PROCEDURE — 93320 DOPPLER ECHO COMPLETE: CPT

## 2018-12-20 RX ORDER — HYDRALAZINE HCL 50 MG
5 TABLET ORAL EVERY 6 HOURS
Qty: 0 | Refills: 0 | Status: DISCONTINUED | OUTPATIENT
Start: 2018-12-20 | End: 2018-12-20

## 2018-12-20 RX ORDER — AMLODIPINE BESYLATE 2.5 MG/1
1 TABLET ORAL
Qty: 30 | Refills: 0 | OUTPATIENT
Start: 2018-12-20 | End: 2019-01-18

## 2018-12-20 RX ADMIN — Medication 25 MICROGRAM(S): at 06:00

## 2018-12-20 RX ADMIN — Medication 5 MILLIGRAM(S): at 12:22

## 2018-12-20 RX ADMIN — Medication 5 MILLIGRAM(S): at 18:21

## 2018-12-20 NOTE — PROGRESS NOTE ADULT - SUBJECTIVE AND OBJECTIVE BOX
Pt s/p loop recorder implantation.  See report for details.  Remove opsite in AM, leave steristrips intact.  OK to shower in AM.  Will arrange outpt wound check ~1 week.  Please call if further questions or issues.    Thanks  Brigido Roldan MD

## 2018-12-20 NOTE — PROGRESS NOTE ADULT - SUBJECTIVE AND OBJECTIVE BOX
Brief Cardiology Procedure Note     Procedure: PHYLLIS    Indication: TIA     Brief HPI: Mr Ling with past medical history significant for HTN, active smoker who presents to ED C/O left sided weakness likely TIA.    Procedure Summary  < from: PHYLLIS Echo Doppler (12.20.18 @ 13:00) >   1. Left ventricular ejection fraction, by visual estimation, is 60 to 65%.   2. Normal global left ventricular systolic function.   3. Trace tricuspid regurgitation.   4. Trace pulmonic valve regurgitation.   5. Dilatation of the aortic root.   6. Color flow doppler and intravenous injection of agitated saline demonstrates the presence of an intact intra atrial septum.   7. No cardioembolic source for TIA found on PHYLLIS.   8. No left atrial appendage thrombus and normal left atrial appendage velocities.    Recommendations  1. Plan for ILR than discharge home   2. Aggressive risk factor modification including smoking cessation   3. Continue ASA and statin therapy

## 2022-03-19 NOTE — ED ADULT NURSE REASSESSMENT NOTE - PUPIL REACTION LEFT
Impression: Dry eye syndrome of bilateral lacrimal glands: H04.123. Plan: Recommend OTC artifical tear use 2-4x daily w/ emphasis on QAM and QHS doses. Discussed possible improvement with thicker gel or ointment QHS. Recommend warm compress w/ lid massage. If symptoms continue/worsen will consider prescription drug therapy. brisk

## 2023-07-10 NOTE — ED ADULT TRIAGE NOTE - WEIGHT IN LBS
Patient is aware
RIGHT SHOULDER ARTHROSCOPY 06/15/23     Requesting refill for oxycodone. He is not sure of when it was last refilled.    [facility] in [address] [place] [address]
179.8

## 2024-01-10 NOTE — ED PROVIDER NOTE - ATTESTATION, MLM
Ashok Khan I have reviewed and confirmed nurses' notes for patient's medications, allergies, medical history, and surgical history.